# Patient Record
Sex: FEMALE | Race: WHITE | NOT HISPANIC OR LATINO | Employment: FULL TIME | URBAN - METROPOLITAN AREA
[De-identification: names, ages, dates, MRNs, and addresses within clinical notes are randomized per-mention and may not be internally consistent; named-entity substitution may affect disease eponyms.]

---

## 2023-10-31 DIAGNOSIS — Z30.41 ENCOUNTER FOR SURVEILLANCE OF CONTRACEPTIVE PILLS: Primary | ICD-10-CM

## 2023-10-31 RX ORDER — NORGESTREL AND ETHINYL ESTRADIOL 0.3-0.03MG
KIT ORAL
Qty: 112 TABLET | Refills: 0 | Status: SHIPPED | OUTPATIENT
Start: 2023-10-31 | End: 2023-12-26

## 2023-12-22 DIAGNOSIS — Z30.41 ENCOUNTER FOR SURVEILLANCE OF CONTRACEPTIVE PILLS: ICD-10-CM

## 2023-12-26 RX ORDER — NORGESTREL AND ETHINYL ESTRADIOL 0.3-0.03MG
KIT ORAL
Qty: 84 TABLET | Refills: 0 | Status: SHIPPED | OUTPATIENT
Start: 2023-12-26 | End: 2024-01-03 | Stop reason: SDUPTHER

## 2024-01-03 RX ORDER — NORGESTREL AND ETHINYL ESTRADIOL 0.3-0.03MG
1 KIT ORAL DAILY
Qty: 84 TABLET | Refills: 0 | Status: SHIPPED | OUTPATIENT
Start: 2024-01-03 | End: 2024-05-29 | Stop reason: ALTCHOICE

## 2024-05-28 NOTE — PROGRESS NOTES
"Subjective   Reason for Visit: Kasey Cabrera is an 38 y.o. female here for a CPE     Chief Complaint   Patient presents with    Annual Exam     Patient is coming in today for a CPE. Patient last exam was with you in Sept. 2022. Patient would like to discuss a lump on right elbow and posterior scalp. /122       HPI  Kasey is a 37 yo female presenting today for Annual CPE      Health Maintenance Due   Topic Date Due    HIV Screening  Never done    MMR Vaccines (1 of 1 - Standard series) Never done    Varicella Vaccines (1 of 2 - 13+ 2-dose series) Never done    Hepatitis C Screening  Never done    Hepatitis B Vaccines (1 of 3 - 19+ 3-dose series) Never done    DTaP/Tdap/Td Vaccines (1 - Tdap) Never done    Cervical Cancer Screening  08/13/2023    COVID-19 Vaccine (1 - 2023-24 season) Never done       Occupation: FT at [employer]   , no children     Do you take any herbs or supplements that were not prescribed by a doctor? Denies   LMP: 5/13/2024  PAP: 2022  GYN: Dr. Gonzalez   Fasting blood work: DUE   HIV/HEP C Screening: DUE   Last eye exam: 2023  Last dental Exam: 2022  Exercise: Not currently   Mood: no concerns   Sleep: challenges w/staying asleep       Pt states she went through a bad divorce   Doing better now  Seeing therapist every 3 weeks  Lorazepam 0.5 (takes 1/2 of a tablet) every once in awhile, needs refilled, last filled > 1 yr ago   Denies SI/HI  OARRS reviewed  No CSA d/t acute setting    BP slightly elevated  Encouraged diet and exercise x 3 months and recheck          Patient Care Team:  MARKY Petersen as PCP - General (Family Medicine)  [facility]     Review of Systems  ROS was completed and all systems are negative with the exception of what was noted in the the HPI.       Objective   Vitals:  /86   Pulse 92   Ht 1.701 m (5' 6.97\")   Wt 105 kg (231 lb)   SpO2 96%   BMI 36.21 kg/m²       Current Outpatient Medications   Medication " Instructions    LORazepam (ATIVAN) 0.5 mg, oral, Nightly PRN       Physical Exam  Vitals reviewed.   Constitutional:       Appearance: She is obese.   Eyes:      Conjunctiva/sclera: Conjunctivae normal.   Cardiovascular:      Pulses: Normal pulses.      Heart sounds: Normal heart sounds.   Pulmonary:      Effort: Pulmonary effort is normal.      Breath sounds: Normal breath sounds.   Neurological:      Mental Status: She is alert and oriented to person, place, and time.   Psychiatric:         Mood and Affect: Mood normal.         Thought Content: Thought content normal.         Judgment: Judgment normal.         Assessment/Plan   Problem List Items Addressed This Visit             ICD-10-CM    Annual physical exam Z00.00    BMI 36.0-36.9,adult Z68.36     - Counseled on healthy diet and regular exercise  - Fall avoidance information provided  - Personalized prevention plan provided   - Vaccines UTD            Relevant Orders    Comprehensive Metabolic Panel    Lipid Panel    Family history of thyroid disorder Z83.49    Relevant Orders    TSH with reflex to Free T4 if abnormal     Other Visit Diagnoses         Codes    Anxiety    -  Primary F41.9    Relevant Medications    LORazepam (Ativan) 0.5 mg tablet    Encounter for hepatitis C screening test for low risk patient     Z11.59    Relevant Orders    Hepatitis C Antibody    Screening for HIV without presence of risk factors     Z11.4    Relevant Orders    HIV 1/2 Antigen/Antibody Screen with Reflex to Confirmation

## 2024-05-29 ENCOUNTER — OFFICE VISIT (OUTPATIENT)
Dept: PRIMARY CARE | Facility: CLINIC | Age: 39
End: 2024-05-29
Payer: COMMERCIAL

## 2024-05-29 VITALS
WEIGHT: 231 LBS | BODY MASS INDEX: 36.26 KG/M2 | HEIGHT: 67 IN | SYSTOLIC BLOOD PRESSURE: 136 MMHG | HEART RATE: 92 BPM | OXYGEN SATURATION: 96 % | DIASTOLIC BLOOD PRESSURE: 86 MMHG

## 2024-05-29 DIAGNOSIS — Z11.4 SCREENING FOR HIV WITHOUT PRESENCE OF RISK FACTORS: ICD-10-CM

## 2024-05-29 DIAGNOSIS — F41.9 ANXIETY: ICD-10-CM

## 2024-05-29 DIAGNOSIS — Z11.59 ENCOUNTER FOR HEPATITIS C SCREENING TEST FOR LOW RISK PATIENT: ICD-10-CM

## 2024-05-29 DIAGNOSIS — Z00.00 ANNUAL PHYSICAL EXAM: Primary | ICD-10-CM

## 2024-05-29 DIAGNOSIS — Z83.49 FAMILY HISTORY OF THYROID DISORDER: ICD-10-CM

## 2024-05-29 PROBLEM — M25.572 ARTHRALGIA OF LEFT ANKLE: Status: ACTIVE | Noted: 2024-05-29

## 2024-05-29 PROCEDURE — 99395 PREV VISIT EST AGE 18-39: CPT | Performed by: NURSE PRACTITIONER

## 2024-05-29 PROCEDURE — 3008F BODY MASS INDEX DOCD: CPT | Performed by: NURSE PRACTITIONER

## 2024-05-29 RX ORDER — LORAZEPAM 0.5 MG/1
TABLET ORAL
COMMUNITY
Start: 2022-01-27 | End: 2024-05-29 | Stop reason: SDUPTHER

## 2024-05-29 ASSESSMENT — PROMIS GLOBAL HEALTH SCALE
RATE_AVERAGE_PAIN: 1
RATE_QUALITY_OF_LIFE: GOOD
RATE_SOCIAL_SATISFACTION: VERY GOOD
RATE_GENERAL_HEALTH: FAIR
EMOTIONAL_PROBLEMS: RARELY
CARRYOUT_SOCIAL_ACTIVITIES: VERY GOOD
CARRYOUT_PHYSICAL_ACTIVITIES: COMPLETELY
RATE_MENTAL_HEALTH: FAIR
RATE_PHYSICAL_HEALTH: FAIR

## 2024-05-29 NOTE — PATIENT INSTRUCTIONS
Thank you for seeing me today.  It was a pleasure to see you again!    Your blood pressure was 136/86  Diet and exercise encouraged  3 months BP check    Refill on Lorazepam     Lab work ordered today.  Please have your blood drawn in the next 1-2 weeks.  You need to be FASTING for 12 hours prior to blood draw.  You may only have water.  Please contact your insurance company to ask about the best location to get blood drawn.  We will contact you with the results of your blood work and any necessary adjustments  to your plan of care, if you do not hear from us within 3-5 days of having your blood drawn, please call the office at [phone].    For assistance with scheduling referrals or consultations, please call [phone] or [phone].    For laboratory, radiology, and other tests, please call [phone] ([phone] for pediatrics).   If you do not get results within 7-10 days, or you have any questions or concerns, please send a message, call the office ([phone]), or return to the office for a follow-up appointment.     For acute/sick visits, if you are unable to get an office visit, you can do a  On Demand Virtual Visit that is accessible via your My Chart account.  For emergencies, call 9-1-1 or go to the nearest Emergency Department.     Please schedule additional appointment(s) to address concern(s) not addressed today.    Please review prescription inserts and published information for possible adverse effects of all medications.     In general, results are discussed over the phone or via  [employer].     You can see your health information, review clinical summaries from office visits & test results online when you follow your health with MY  Chart, a personal health record.   To sign up go to [email or web address].   If you need assistance with signing up or trouble getting into your account call [employer] Patient Line 24/7 at [phone]     RTC 3 MONTHS NURSE SCHEDULE FOR BP CHECK

## 2024-05-29 NOTE — ASSESSMENT & PLAN NOTE
- Counseled on healthy diet and regular exercise  - Fall avoidance information provided  - Personalized prevention plan provided   - Vaccines UTD

## 2024-05-30 RX ORDER — LORAZEPAM 0.5 MG/1
0.5 TABLET ORAL NIGHTLY PRN
Qty: 7 TABLET | Refills: 0 | Status: SHIPPED | OUTPATIENT
Start: 2024-05-30 | End: 2024-06-06

## 2024-06-03 ENCOUNTER — LAB (OUTPATIENT)
Dept: LAB | Facility: LAB | Age: 39
End: 2024-06-03
Payer: COMMERCIAL

## 2024-06-03 DIAGNOSIS — Z11.4 SCREENING FOR HIV WITHOUT PRESENCE OF RISK FACTORS: ICD-10-CM

## 2024-06-03 DIAGNOSIS — Z83.49 FAMILY HISTORY OF THYROID DISORDER: ICD-10-CM

## 2024-06-03 DIAGNOSIS — Z11.59 ENCOUNTER FOR HEPATITIS C SCREENING TEST FOR LOW RISK PATIENT: ICD-10-CM

## 2024-06-03 LAB
ALBUMIN SERPL BCP-MCNC: 4.4 G/DL (ref 3.4–5)
ALP SERPL-CCNC: 87 U/L (ref 33–110)
ALT SERPL W P-5'-P-CCNC: 62 U/L (ref 7–45)
ANION GAP SERPL CALC-SCNC: 13 MMOL/L (ref 10–20)
AST SERPL W P-5'-P-CCNC: 28 U/L (ref 9–39)
BILIRUB SERPL-MCNC: 0.5 MG/DL (ref 0–1.2)
BUN SERPL-MCNC: 14 MG/DL (ref 6–23)
CALCIUM SERPL-MCNC: 9.5 MG/DL (ref 8.6–10.3)
CHLORIDE SERPL-SCNC: 102 MMOL/L (ref 98–107)
CHOLEST SERPL-MCNC: 204 MG/DL (ref 0–199)
CHOLESTEROL/HDL RATIO: 5.7
CO2 SERPL-SCNC: 25 MMOL/L (ref 21–32)
CREAT SERPL-MCNC: 0.67 MG/DL (ref 0.5–1.05)
EGFRCR SERPLBLD CKD-EPI 2021: >90 ML/MIN/1.73M*2
GLUCOSE SERPL-MCNC: 94 MG/DL (ref 74–99)
HDLC SERPL-MCNC: 35.6 MG/DL
LDLC SERPL CALC-MCNC: 138 MG/DL
NON HDL CHOLESTEROL: 168 MG/DL (ref 0–149)
POTASSIUM SERPL-SCNC: 4.1 MMOL/L (ref 3.5–5.3)
PROT SERPL-MCNC: 7.2 G/DL (ref 6.4–8.2)
SODIUM SERPL-SCNC: 136 MMOL/L (ref 136–145)
TRIGL SERPL-MCNC: 153 MG/DL (ref 0–149)
TSH SERPL-ACNC: 3.72 MIU/L (ref 0.44–3.98)
VLDL: 31 MG/DL (ref 0–40)

## 2024-06-03 PROCEDURE — 80061 LIPID PANEL: CPT

## 2024-06-03 PROCEDURE — 87389 HIV-1 AG W/HIV-1&-2 AB AG IA: CPT

## 2024-06-03 PROCEDURE — 36415 COLL VENOUS BLD VENIPUNCTURE: CPT

## 2024-06-03 PROCEDURE — 80053 COMPREHEN METABOLIC PANEL: CPT

## 2024-06-03 PROCEDURE — 86803 HEPATITIS C AB TEST: CPT

## 2024-06-03 PROCEDURE — 84443 ASSAY THYROID STIM HORMONE: CPT

## 2024-06-04 LAB
HCV AB SER QL: NONREACTIVE
HIV 1+2 AB+HIV1 P24 AG SERPL QL IA: NONREACTIVE

## 2024-06-05 NOTE — RESULT ENCOUNTER NOTE
Kasey Cabrera    I have reviewed all of your blood work and it looks fine. Your ALT is slightly elevated, watch alcohol and Tylenol use.     Your LDLs (bad cholesterol) were higher than normal.    Please try to exercise regularly: at least 150 minutes/week  Cut back on foods high in trans fat and saturated fats, like red meats, creams, cheese, and butter  Limit sweets and salt   Our goal is to have your LDL's < 70    If you have any questions, please feel free to call my office.    Take Care,   Concha

## 2024-09-12 ENCOUNTER — APPOINTMENT (OUTPATIENT)
Dept: PRIMARY CARE | Facility: CLINIC | Age: 39
End: 2024-09-12
Payer: COMMERCIAL

## 2025-05-07 NOTE — PROGRESS NOTES
Subjective   Patient ID: Kasey Cabrera is a 39 y.o. female who presents for Annual Exam.    PAP 8/13/2020 NEG- HPV NEG  MAMMO NEVER  DEXA NEVER  COLON NEVER  Gardasil done     Last seen June 2022.  left her a few years ago,  now. Still sees therapist regularly. She has the house and dog. Going to counseling every other week. No current partner. Had STD testing already and she did not have sex with him when he was with the other woman.    Noticed a right breast lump the past few weeks, hit and miss.     Joint swelling was allergic to cocconut.     Cryselle stopped a year ago. Regular periods. Feels better.     [email or web address]     Works at [employer] now.     One grandmother(M) recurrent ovarian ca(chemo now) and other grandmother lung ca and brain ca. Both passed.       Father kidney cancer.    No vitamins. Some exercise.    Review of Systems   All other systems reviewed and are negative.      Objective   Constitutional: Alert and in no acute distress. Well developed, well nourished.  Neck: no neck asymmetry. Supple and thyroid not enlarged and there were no palpable thyroid nodules.  Chest: Breasts normal appearance, no nipple discharge and no skin changes and palpation of breasts and axillae: subtle mass right breast 1cm 8:00 and no axillary lymphadenopathy.  Abdomen: soft nontender; no abdominal mass palpated, no organomegaly and no hernias.  Genitourinary: external genitalia: normal, no inguinal lymphadenopathy, Bartholin's urethral and [place]'s glands: normal, urethra: normal and bladder: normal on palpation.  Vagina: normal.  Cervix: normal.  Uterus: normal.   Right adnexa/parametria: normal.  Left adnexa/parametria: normal.  Skin: normal skin color and pigmentation, normal skin turgor and no rash.  Psychiatric: alert and oriented x 3, affect normal to patient baseline and mood appropriate.     Assessment/Plan   -Pap-HPV  -good at 40.  -Recommend vit D.  -good and ultrasound for right breast  mass.

## 2025-05-08 ENCOUNTER — APPOINTMENT (OUTPATIENT)
Dept: OBSTETRICS AND GYNECOLOGY | Facility: CLINIC | Age: 40
End: 2025-05-08
Payer: COMMERCIAL

## 2025-05-08 VITALS
HEIGHT: 66 IN | DIASTOLIC BLOOD PRESSURE: 72 MMHG | BODY MASS INDEX: 37.77 KG/M2 | SYSTOLIC BLOOD PRESSURE: 122 MMHG | WEIGHT: 235 LBS

## 2025-05-08 DIAGNOSIS — N63.10 MASS OF RIGHT BREAST, UNSPECIFIED QUADRANT: ICD-10-CM

## 2025-05-08 DIAGNOSIS — Z12.4 CERVICAL CANCER SCREENING: ICD-10-CM

## 2025-05-08 DIAGNOSIS — Z01.419 WELL WOMAN EXAM WITH ROUTINE GYNECOLOGICAL EXAM: Primary | ICD-10-CM

## 2025-05-08 PROCEDURE — 1036F TOBACCO NON-USER: CPT | Performed by: OBSTETRICS & GYNECOLOGY

## 2025-05-08 PROCEDURE — 3008F BODY MASS INDEX DOCD: CPT | Performed by: OBSTETRICS & GYNECOLOGY

## 2025-05-08 PROCEDURE — 99395 PREV VISIT EST AGE 18-39: CPT | Performed by: OBSTETRICS & GYNECOLOGY

## 2025-05-08 PROCEDURE — 87626 HPV SEP HI-RSK TYP&POOL RSLT: CPT

## 2025-05-20 ENCOUNTER — HOSPITAL ENCOUNTER (OUTPATIENT)
Dept: RADIOLOGY | Facility: HOSPITAL | Age: 40
Discharge: HOME | End: 2025-05-20
Payer: COMMERCIAL

## 2025-05-20 VITALS — WEIGHT: 235 LBS | HEIGHT: 66 IN | BODY MASS INDEX: 37.77 KG/M2

## 2025-05-20 DIAGNOSIS — N63.10 MASS OF RIGHT BREAST, UNSPECIFIED QUADRANT: ICD-10-CM

## 2025-05-20 PROCEDURE — 77062 BREAST TOMOSYNTHESIS BI: CPT

## 2025-05-20 PROCEDURE — 77062 BREAST TOMOSYNTHESIS BI: CPT | Performed by: STUDENT IN AN ORGANIZED HEALTH CARE EDUCATION/TRAINING PROGRAM

## 2025-05-20 PROCEDURE — 77066 DX MAMMO INCL CAD BI: CPT | Performed by: STUDENT IN AN ORGANIZED HEALTH CARE EDUCATION/TRAINING PROGRAM

## 2025-05-20 PROCEDURE — 76642 ULTRASOUND BREAST LIMITED: CPT | Mod: RT

## 2025-05-20 PROCEDURE — 76982 USE 1ST TARGET LESION: CPT | Mod: RT

## 2025-05-20 PROCEDURE — 76642 ULTRASOUND BREAST LIMITED: CPT | Performed by: STUDENT IN AN ORGANIZED HEALTH CARE EDUCATION/TRAINING PROGRAM

## 2025-05-23 ENCOUNTER — TELEPHONE (OUTPATIENT)
Dept: OBSTETRICS AND GYNECOLOGY | Facility: CLINIC | Age: 40
End: 2025-05-23
Payer: COMMERCIAL

## 2025-05-24 DIAGNOSIS — N63.10 MASS OF RIGHT BREAST, UNSPECIFIED QUADRANT: Primary | ICD-10-CM
